# Patient Record
Sex: FEMALE | Race: WHITE
[De-identification: names, ages, dates, MRNs, and addresses within clinical notes are randomized per-mention and may not be internally consistent; named-entity substitution may affect disease eponyms.]

---

## 2017-01-07 ENCOUNTER — HOSPITAL ENCOUNTER (EMERGENCY)
Age: 22
Discharge: HOME | End: 2017-01-07
Payer: COMMERCIAL

## 2017-01-07 DIAGNOSIS — R07.89: Primary | ICD-10-CM

## 2017-01-07 DIAGNOSIS — J45.909: ICD-10-CM

## 2017-01-07 PROCEDURE — 99283 EMERGENCY DEPT VISIT LOW MDM: CPT

## 2018-02-18 ENCOUNTER — HOSPITAL ENCOUNTER (EMERGENCY)
Dept: HOSPITAL 76 - ED | Age: 23
Discharge: HOME | End: 2018-02-18
Payer: MEDICAID

## 2018-02-18 VITALS — SYSTOLIC BLOOD PRESSURE: 140 MMHG | DIASTOLIC BLOOD PRESSURE: 95 MMHG

## 2018-02-18 DIAGNOSIS — Z3A.01: ICD-10-CM

## 2018-02-18 DIAGNOSIS — Z32.01: Primary | ICD-10-CM

## 2018-02-18 DIAGNOSIS — Z79.899: ICD-10-CM

## 2018-02-18 LAB
CLARITY UR REFRACT.AUTO: CLEAR
GLUCOSE UR QL STRIP.AUTO: NEGATIVE MG/DL
HCG UR QL: POSITIVE
KETONES UR QL STRIP.AUTO: NEGATIVE MG/DL
MUCOUS THREADS URNS QL MICRO: (no result)
NITRITE UR QL STRIP.AUTO: POSITIVE
PH UR STRIP.AUTO: 5.5 PH (ref 5–7.5)
PROT UR STRIP.AUTO-MCNC: NEGATIVE MG/DL
RBC # UR STRIP.AUTO: NEGATIVE /UL
RBC # URNS HPF: (no result) /HPF (ref 0–5)
SP GR UR STRIP.AUTO: 1.02 (ref 1–1.03)
SQUAMOUS URNS QL MICRO: (no result)
UROBILINOGEN UR QL STRIP.AUTO: (no result) E.U./DL
UROBILINOGEN UR STRIP.AUTO-MCNC: NEGATIVE MG/DL

## 2018-02-18 PROCEDURE — 84702 CHORIONIC GONADOTROPIN TEST: CPT

## 2018-02-18 PROCEDURE — 99283 EMERGENCY DEPT VISIT LOW MDM: CPT

## 2018-02-18 PROCEDURE — 36415 COLL VENOUS BLD VENIPUNCTURE: CPT

## 2018-02-18 PROCEDURE — 81025 URINE PREGNANCY TEST: CPT

## 2018-02-18 PROCEDURE — 81001 URINALYSIS AUTO W/SCOPE: CPT

## 2018-02-18 PROCEDURE — 81003 URINALYSIS AUTO W/O SCOPE: CPT

## 2018-02-18 PROCEDURE — 87086 URINE CULTURE/COLONY COUNT: CPT

## 2018-02-18 NOTE — ED PHYSICIAN DOCUMENTATION
History of Present Illness





- Stated complaint


Stated Complaint: FEMALE /POSS. PREG





- Chief complaint


Chief Complaint: General





- History obtained from


History obtained from: Patient, Family





- History of Present Illness


Timing: Today





- Additonal information


Additional information: 





22-year-old female has taken a pregnancy test and is found that it is positive.

  She is here now wondering if she can get a blood test to confirm this.She is 

not having other symptoms except for tenderness to her nipples.  She has a 

decreased appetite.  She has a





Review of Systems


Constitutional: reports: Fatigue.  denies: Fever, Chills, Myalgias


Eyes: denies: Decreased vision


Ears: denies: Ear pain


Nose: denies: Congestion


Throat: denies: Sore throat


Cardiac: denies: Chest pain / pressure, Palpitations


Respiratory: denies: Dyspnea, Cough


GI: reports: Nausea.  denies: Abdominal Pain, Vomiting


: denies: Dysuria, Frequency





PD PAST MEDICAL HISTORY





- Past Medical History


Respiratory: Asthma


Psych: Depression, Anxiety





- Past Surgical History


Past Surgical History: Yes


General: Appendectomy





- Present Medications


Home Medications: 


 Ambulatory Orders











 Medication  Instructions  Recorded  Confirmed


 


Albuterol Sulfate [Proair Hfa 2 puffs INH Q4H PRN #1 inhaler 01/07/17 





Inhaler]   


 


Birth Control Pill 1 tab ORAL DAILY 01/07/17 01/07/17


 


cloNIDine HCl [Clonidine HCl] 0.2 tab ORAL DAILY PRN 01/07/17 01/07/17


 


predniSONE [Deltasone] 60 mg PO DAILY 4 Days  tablet 01/07/17 














- Allergies


Allergies/Adverse Reactions: 


 Allergies











Allergy/AdvReac Type Severity Reaction Status Date / Time


 


bacitracin AdvReac Unknown Rash Verified 01/07/17 15:16





[From Neosporin     





(wma-[id number])]     


 


bacitracin zinc * AdvReac Unknown Rash Verified 01/07/17 15:16





[From Neosporin     





(zgt-xkl-ehulj)]     


 


fluticasone propionate * AdvReac Unknown Respiratory Verified 01/07/17 15:16





[From Flovent Diskus]     


 


neomycin sulfate * AdvReac Unknown Rash Verified 01/07/17 15:16





[From Neosporin     





(zak-aze-[id number])]     


 


polymyxin B AdvReac Unknown Rash Verified 01/07/17 15:16





[From Neosporin     





(pyb-ope-eqhko)]     














- Social History


Does the pt smoke?: No


Smoking Status: Never smoker


Does the pt drink ETOH?: No


Does the pt have substance abuse?: No





- Immunizations


Immunizations are current?: Yes





- POLST


Patient has POLST: No





PD ED PE NORMAL





- Vitals


Vital signs reviewed: Yes (hypertensive )





- General


General: Alert and oriented X 3, No acute distress, Well developed/nourished





- HEENT


HEENT: Atraumatic, PERRL, EOMI





- Respiratory


Respiratory: No respiratory distress





- Derm


Derm: Normal color, Warm and dry, No rash





- Extremities


Extremities: No deformity, No edema





- Neuro


Neuro: Alert and oriented X 3, CNs 2-12 intact, No motor deficit, No sensory 

deficit, Normal speech


Eye Opening: Spontaneous


Motor: Obeys Commands


Verbal: Oriented


GCS Score: 15





- Psych


Psych: Normal mood, Normal affect





Results





- Vitals


Vitals: 


 Vital Signs - 24 hr











  02/18/18





  10:46


 


Temperature 36.9 C


 


Heart Rate 92


 


Respiratory 16





Rate 


 


Blood Pressure 140/95 H


 


O2 Saturation 98








 Oxygen











O2 Source                      Room air

















- Labs


Labs: 


 Laboratory Tests











  02/18/18 02/18/18





  10:58 10:58


 


HCG, Quant  130.25 


 


Urine Color   YELLOW


 


Urine Clarity   CLEAR


 


Urine pH   5.5


 


Ur Specific Gravity   1.025


 


Urine Protein   NEGATIVE


 


Urine Glucose (UA)   NEGATIVE


 


Urine Ketones   NEGATIVE


 


Urine Occult Blood   NEGATIVE


 


Urine Nitrite   POSITIVE H


 


Urine Bilirubin   NEGATIVE


 


Urine Urobilinogen   0.2 (NORMAL)


 


Ur Leukocyte Esterase   NEGATIVE


 


Urine RBC   0-5


 


Urine WBC   0-3


 


Ur Squamous Epith Cells   MOD Squamous H


 


Urine Bacteria   Many H


 


Urine Mucus   Few Strands


 


Ur Microscopic Review   INDICATED


 


Urine Culture Comments   NOT INDICATED


 


Urine HCG, Qual   POSITIVE














PD MEDICAL DECISION MAKING





- ED course


Complexity details: reviewed results, re-evaluated patient, considered 

differential, d/w patient, d/w family


ED course: 





22-year-old female with early pregnancy signs and symptoms is pregnant.





Departure





- Departure


Disposition: 01 Home, Self Care


Clinical Impression: 


Pregnancy


Qualifiers:


 Weeks of gestation: less than 8 weeks Qualified Code(s): Z3A.01 - Less than 8 

weeks gestation of pregnancy





Condition: Stable


Instructions:  ED Prenatal Care, ED Preg Established Normal Sxs


Follow-Up: 


Tania Mueller ARNP [Primary Care Provider] - 


Comments: 


Today it appears there is evidence of early pregnancy.  The medications you are 

taking the sertraline does not cause birth defects but could cause some 

problems later in pregnancy talk to your OB doctor about discontinuation of 

this medicine later in her pregnancy.  The clonidine may be more of a problem 

as this a schedule C medication.  Discontinue taking this medication.


Discharge Date/Time: 02/18/18 12:36

## 2018-03-11 ENCOUNTER — HOSPITAL ENCOUNTER (EMERGENCY)
Dept: HOSPITAL 76 - ED | Age: 23
Discharge: HOME | End: 2018-03-11
Payer: MEDICAID

## 2018-03-11 VITALS — DIASTOLIC BLOOD PRESSURE: 91 MMHG | SYSTOLIC BLOOD PRESSURE: 131 MMHG

## 2018-03-11 DIAGNOSIS — O26.891: Primary | ICD-10-CM

## 2018-03-11 DIAGNOSIS — Z3A.01: ICD-10-CM

## 2018-03-11 DIAGNOSIS — R05: ICD-10-CM

## 2018-03-11 DIAGNOSIS — H66.002: ICD-10-CM

## 2018-03-11 PROCEDURE — 99283 EMERGENCY DEPT VISIT LOW MDM: CPT

## 2018-03-11 NOTE — ED PHYSICIAN DOCUMENTATION
PD HPI URI





- Stated complaint


Stated Complaint: COUGH/COLD





- Chief complaint


Chief Complaint: Heent





- History obtained from


History obtained from: Patient, Family





- History of Present Illness


Timing - onset: How many days ago (3)


Timing duration: Days (3)


Timing details: Gradual onset, Still present


Associated symptoms: Ear pain, Nasal congestion, Rhinorrhea, Sore throat, Dry 

cough.  No: Fever


Improves by: Rest


Worsened by: Activity


Similar symptoms before: Diagnosis (OM and bronchitis)


Recently seen: Emergency Dept (seen last month for new onset pregnancy)





- Additional information


Additional information: 





22-year-old female who is about 7 weeks pregnant has developed a cough and 

congestion.  She is having coughing paroxysms and she has had to use her 

inhaler.  She is developed now a bit of a sore throat on the left side and pain 

in her left ear.She has an appointment to see her first OB visit in 2 weeks.





Review of Systems


Constitutional: denies: Fever


Eyes: denies: Decreased vision


Ears: reports: Ear pain


Nose: reports: Rhinorrhea / runny nose, Congestion


Throat: reports: Sore throat


Cardiac: denies: Chest pain / pressure, Palpitations


Respiratory: reports: Dyspnea, Cough


GI: denies: Nausea, Vomiting


: reports: Frequency.  denies: Dysuria


Skin: denies: Rash


Musculoskeletal: denies: Neck pain, Back pain, Extremity pain


Neurologic: denies: Generalized weakness, Focal weakness, Numbness





PD PAST MEDICAL HISTORY





- Past Medical History


Past Medical History: Yes


Respiratory: Asthma


Psych: Depression, Anxiety





- Past Surgical History


Past Surgical History: Yes


General: Appendectomy





- Present Medications


Home Medications: 


 Ambulatory Orders











 Medication  Instructions  Recorded  Confirmed


 


Azithromycin [Zithromax] 250 mg PO DAILY #4 tablet 03/11/18 


 


Levothyroxine [Synthroid] 88 mcg PO QDAC 03/11/18 














- Allergies


Allergies/Adverse Reactions: 


 Allergies











Allergy/AdvReac Type Severity Reaction Status Date / Time


 


bacitracin AdvReac Unknown Rash Verified 03/11/18 19:23





[From Neosporin     





(brv-cvm-voolt)]     


 


bacitracin zinc * AdvReac Unknown Rash Verified 03/11/18 19:23





[From Neosporin     





(kcs-gyb-wiwge)]     


 


fluticasone propionate * AdvReac Unknown Respiratory Verified 03/11/18 19:23





[From Flovent Diskus]     


 


neomycin sulfate * AdvReac Unknown Rash Verified 03/11/18 19:23





[From Neosporin     





(cgt-otb-svaub)]     


 


polymyxin B AdvReac Unknown Rash Verified 03/11/18 19:23





[From Neosporin     





(fhb-mnz-grclw)]     














- Social History


Does the pt smoke?: No


Smoking Status: Never smoker


Does the pt drink ETOH?: No


Does the pt have substance abuse?: No





- Immunizations


Immunizations are current?: Yes





- POLST


Patient has POLST: No





PD ED PE NORMAL





- Vitals


Vital signs reviewed: Yes (Normal)





- General


General: Alert and oriented X 3, No acute distress, Well developed/nourished





- HEENT


HEENT: Atraumatic, PERRL, EOMI, Other (There is inflammation to the left TM 

with some distortion of landmarks the right is clear)





- Neck


Neck: Supple, no meningeal sign, No bony TTP





- Cardiac


Cardiac: RRR, No murmur





- Respiratory


Respiratory: No respiratory distress, Clear bilaterally





- Abdomen


Abdomen: Soft, Non tender





- Back


Back: No CVA TTP, No spinal TTP





- Derm


Derm: Normal color, Warm and dry, No rash





- Extremities


Extremities: No deformity, No edema





- Neuro


Neuro: No motor deficit, No sensory deficit


Eye Opening: Spontaneous


Motor: Obeys Commands


Verbal: Oriented


GCS Score: 15





- Psych


Psych: Normal mood, Normal affect





Results





- Vitals


Vitals: 





 Vital Signs - 24 hr











  03/11/18 03/11/18





  19:22 21:11


 


Temperature 36.6 C 36.4 C L


 


Heart Rate 98 


 


Respiratory 16 





Rate  


 


Blood Pressure 120/80 


 


O2 Saturation 100 








 Oxygen











O2 Source                      Room air

















Procedures





- Bedside sono


Bedside sono by EMP: 





With use of bedside ultrasound the pelvis is imaged and there is a poor 

acoustic window but a gestational sac measuring 7 weeks 6 days is present and 

further imaging is not indicated today.





PD MEDICAL DECISION MAKING





- ED course


Complexity details: considered differential, d/w patient, d/w family


ED course: 





22-year-old female 7 1/2 weeks pregnant has left otitis and she is having 

coughing paroxysms.  She is treated in the emergency department with 

dexamethasone and azithromycin.  She will follow-up with her OB/GYN in 2 weeks.





Departure





- Departure


Disposition: 01 Home, Self Care


Clinical Impression: 


Otitis media


Qualifiers:


 Otitis media type: suppurative Chronicity: acute Laterality: left Recurrence: 

not specified as recurrent Spontaneous tympanic membrane rupture: without 

spontaneous rupture Qualified Code(s): H66.002 - Acute suppurative otitis media 

without spontaneous rupture of ear drum, left ear





Condition: Stable


Instructions:  ED Otitis Media Acute Adult


Follow-Up: 


Your, doctor [Other]


Prescriptions: 


Azithromycin [Zithromax] 250 mg PO DAILY #4 tablet

## 2018-03-26 ENCOUNTER — HOSPITAL ENCOUNTER (EMERGENCY)
Dept: HOSPITAL 76 - ED | Age: 23
Discharge: HOME | End: 2018-03-26
Payer: MEDICAID

## 2018-03-26 VITALS — SYSTOLIC BLOOD PRESSURE: 119 MMHG | DIASTOLIC BLOOD PRESSURE: 85 MMHG

## 2018-03-26 DIAGNOSIS — Z3A.09: ICD-10-CM

## 2018-03-26 DIAGNOSIS — O46.91: Primary | ICD-10-CM

## 2018-03-26 LAB
ALBUMIN DIAFP-MCNC: 3.4 G/DL (ref 3.2–5.5)
ALBUMIN/GLOB SERPL: 0.9 {RATIO} (ref 1–2.2)
ALP SERPL-CCNC: 51 IU/L (ref 42–121)
ALT SERPL W P-5'-P-CCNC: 15 IU/L (ref 10–60)
ANION GAP SERPL CALCULATED.4IONS-SCNC: 9 MMOL/L (ref 6–13)
AST SERPL W P-5'-P-CCNC: 16 IU/L (ref 10–42)
BASOPHILS NFR BLD AUTO: 0.1 10^3/UL (ref 0–0.1)
BASOPHILS NFR BLD AUTO: 0.6 %
BILIRUB BLD-MCNC: 0.6 MG/DL (ref 0.2–1)
BUN SERPL-MCNC: 9 MG/DL (ref 6–20)
CALCIUM UR-MCNC: 8.8 MG/DL (ref 8.5–10.3)
CHLORIDE SERPL-SCNC: 105 MMOL/L (ref 101–111)
CLARITY UR REFRACT.AUTO: CLEAR
CO2 SERPL-SCNC: 22 MMOL/L (ref 21–32)
CREAT SERPLBLD-SCNC: 0.6 MG/DL (ref 0.4–1)
EOSINOPHIL # BLD AUTO: 0.1 10^3/UL (ref 0–0.7)
EOSINOPHIL NFR BLD AUTO: 1.2 %
ERYTHROCYTE [DISTWIDTH] IN BLOOD BY AUTOMATED COUNT: 14.3 % (ref 12–15)
GFRSERPLBLD MDRD-ARVRAT: 123 ML/MIN/{1.73_M2} (ref 89–?)
GLOBULIN SER-MCNC: 3.7 G/DL (ref 2.1–4.2)
GLUCOSE SERPL-MCNC: 95 MG/DL (ref 70–100)
GLUCOSE UR QL STRIP.AUTO: NEGATIVE MG/DL
HGB UR QL STRIP: 11.9 G/DL (ref 12–16)
KETONES UR QL STRIP.AUTO: NEGATIVE MG/DL
LIPASE SERPL-CCNC: 21 U/L (ref 22–51)
LYMPHOCYTES # SPEC AUTO: 2 10^3/UL (ref 1.5–3.5)
LYMPHOCYTES NFR BLD AUTO: 22.5 %
MCH RBC QN AUTO: 25.8 PG (ref 27–31)
MCHC RBC AUTO-ENTMCNC: 32.6 G/DL (ref 32–36)
MCV RBC AUTO: 79.2 FL (ref 81–99)
MONOCYTES # BLD AUTO: 0.6 10^3/UL (ref 0–1)
MONOCYTES NFR BLD AUTO: 7 %
NEUTROPHILS # BLD AUTO: 6.2 10^3/UL (ref 1.5–6.6)
NEUTROPHILS # SNV AUTO: 9.1 X10^3/UL (ref 4.8–10.8)
NEUTROPHILS NFR BLD AUTO: 68.7 %
NITRITE UR QL STRIP.AUTO: NEGATIVE
PDW BLD AUTO: 6.6 FL (ref 7.9–10.8)
PH UR STRIP.AUTO: 5.5 PH (ref 5–7.5)
PLATELET # BLD: 310 10^3/UL (ref 130–450)
PROT SPEC-MCNC: 7.1 G/DL (ref 6.7–8.2)
PROT UR STRIP.AUTO-MCNC: NEGATIVE MG/DL
RBC # UR STRIP.AUTO: NEGATIVE /UL
RBC MAR: 4.61 10^6/UL (ref 4.2–5.4)
SODIUM SERPLBLD-SCNC: 136 MMOL/L (ref 135–145)
SP GR UR STRIP.AUTO: >=1.03 (ref 1–1.03)
UROBILINOGEN UR QL STRIP.AUTO: (no result) E.U./DL
UROBILINOGEN UR STRIP.AUTO-MCNC: NEGATIVE MG/DL

## 2018-03-26 PROCEDURE — 83690 ASSAY OF LIPASE: CPT

## 2018-03-26 PROCEDURE — 85025 COMPLETE CBC W/AUTO DIFF WBC: CPT

## 2018-03-26 PROCEDURE — 87086 URINE CULTURE/COLONY COUNT: CPT

## 2018-03-26 PROCEDURE — 36415 COLL VENOUS BLD VENIPUNCTURE: CPT

## 2018-03-26 PROCEDURE — 81001 URINALYSIS AUTO W/SCOPE: CPT

## 2018-03-26 PROCEDURE — 81003 URINALYSIS AUTO W/O SCOPE: CPT

## 2018-03-26 PROCEDURE — 76801 OB US < 14 WKS SINGLE FETUS: CPT

## 2018-03-26 PROCEDURE — 84702 CHORIONIC GONADOTROPIN TEST: CPT

## 2018-03-26 PROCEDURE — 99283 EMERGENCY DEPT VISIT LOW MDM: CPT

## 2018-03-26 PROCEDURE — 80053 COMPREHEN METABOLIC PANEL: CPT

## 2018-03-26 NOTE — ULTRASOUND PRELIMINARY REPORT
Accession: [id number]

Exam: US OB FIRST TRIMESTER

 

IMPRESSION: 

1. Single viable intrauterine pregnancy at EGA 9 weeks 0 days with STEFANO 10/29/2018 based on crown-rump
 length.

 

2. Small perigestational fluid collection, nonhemorrhagic in appearance.

 

3. Unremarkable ovaries and adnexa for age and menstrual status.

 

Complete fetal anatomic survey recommended at 20 weeks.

 

[facility]

 

SITE ID: [id number]

## 2018-03-26 NOTE — ED PHYSICIAN DOCUMENTATION
PD HPI FEMALE 





- Stated complaint


Stated Complaint: 10 WKS/CRAMPS





- Chief complaint


Chief Complaint: General





- History obtained from


History obtained from: Patient





- History of Present Illness


Timing - onset: Today (about 10 am today, onset of some lower abd cramping with 

spotting of vaginal bleeding.)


Timing - duration: Days (1)


Timing - details: Gradual onset, Still present, Waxing and waning


Associated symptoms: Pelvic pain, Vaginal bleeding.  No: Fever, Abdominal pain, 

Vaginal discharge, Dysuria, Urinary frequency


Contributing factors: Pregnant (estimated 10 weeks by dates.)


OB-GYN History: G (1), P (0)


Similar symptoms before: Has not had sx before


Recently seen: Emergency Dept (2 weeks ago for URI/ear infection and Rx with 

Zithromax and was feeling better.)





Review of Systems


Constitutional: denies: Fever, Chills


Nose: denies: Rhinorrhea / runny nose, Congestion


Throat: denies: Sore throat


Respiratory: denies: Cough


GI: reports: Nausea.  denies: Vomiting, Diarrhea


: denies: Dysuria, Frequency, Discharge


Musculoskeletal: denies: Neck pain, Back pain





PD PAST MEDICAL HISTORY





- Past Medical History


Respiratory: Asthma


Psych: Depression, Anxiety





- Past Surgical History


Past Surgical History: Yes


General: Appendectomy





- Present Medications


Home Medications: 


 Ambulatory Orders











 Medication  Instructions  Recorded  Confirmed


 


Levothyroxine [Synthroid] 88 mcg PO QDAC 03/11/18 


 


Ondansetron HCl [Zofran] 4 mg PO Q6H PRN #20 tablet 03/26/18 


 


Pnv95/Ferrous Fumarate/FA 1 each PO DAILY 03/26/18 03/26/18





[Prenatal Tablet]   














- Allergies


Allergies/Adverse Reactions: 


 Allergies











Allergy/AdvReac Type Severity Reaction Status Date / Time


 


bacitracin AdvReac Unknown Rash Verified 03/26/18 16:44





[From Neosporin     





(chc-leh-pvdmz)]     


 


bacitracin zinc * AdvReac Unknown Rash Verified 03/26/18 16:44





[From Neosporin     





(qfx-dmg-dkgoo)]     


 


fluticasone propionate * AdvReac Unknown Respiratory Verified 03/26/18 16:44





[From Flovent Diskus]     


 


neomycin sulfate * AdvReac Unknown Rash Verified 03/26/18 16:44





[From Neosporin     





(vht-kif-hwzql)]     


 


polymyxin B AdvReac Unknown Rash Verified 03/26/18 16:44





[From Neosporin     





(yaz-uvg-yskpb)]     














- Social History


Does the pt smoke?: No


Smoking Status: Never smoker


Does the pt drink ETOH?: No


Does the pt have substance abuse?: No





- Immunizations


Immunizations are current?: Yes





- POLST


Patient has POLST: No





PD ED PE NORMAL





- Vitals


Vital signs reviewed: Yes





- General


General: Alert and oriented X 3, No acute distress, Well developed/nourished





- Neck


Neck: Supple, no meningeal sign, No adenopathy





- Cardiac


Cardiac: RRR, No murmur





- Respiratory


Respiratory: Clear bilaterally





- Abdomen


Abdomen: Normal bowel sounds, Soft, Non distended, No organomegaly, Other (mild 

tender suprapubic without guarding. Bedside U/S, I could not get a good view, 

so will get formal U/S. )





Results





- Vitals


Vitals: 


 Vital Signs - 24 hr











  03/26/18 03/26/18





  16:42 20:42


 


Temperature 36.8 C 


 


Heart Rate 90 96


 


Respiratory 18 18





Rate  


 


Blood Pressure 126/73 119/85 H


 


O2 Saturation 99 99








 Oxygen











O2 Source                      Room air

















- Labs


Labs: 


 Laboratory Tests











  03/26/18 03/26/18 03/26/18





  17:18 17:18 17:18


 


WBC  9.1  


 


RBC  4.61  


 


Hgb  11.9 L  


 


Hct  36.5 L  


 


MCV  79.2 L  


 


MCH  25.8 L  


 


MCHC  32.6  


 


RDW  14.3  


 


Plt Count  310  


 


MPV  6.6 L  


 


Neut #  6.2  


 


Lymph #  2.0  


 


Mono #  0.6  


 


Eos #  0.1  


 


Baso #  0.1  


 


Absolute Nucleated RBC  0.01  


 


Nucleated RBC %  0.1  


 


Sodium   136 


 


Potassium   3.8 


 


Chloride   105 


 


Carbon Dioxide   22 


 


Anion Gap   9.0 


 


BUN   9 


 


Creatinine   0.6 


 


Estimated GFR (MDRD)   123 


 


Glucose   95 


 


Calcium   8.8 


 


Total Bilirubin   0.6 


 


AST   16 


 


ALT   15 


 


Alkaline Phosphatase   51 


 


Total Protein   7.1 


 


Albumin   3.4 


 


Globulin   3.7 


 


Albumin/Globulin Ratio   0.9 L 


 


Lipase   21 L 


 


HCG, Quant    757411.00


 


Urine Color   


 


Urine Clarity   


 


Urine pH   


 


Ur Specific Gravity   


 


Urine Protein   


 


Urine Glucose (UA)   


 


Urine Ketones   


 


Urine Occult Blood   


 


Urine Nitrite   


 


Urine Bilirubin   


 


Urine Urobilinogen   


 


Ur Leukocyte Esterase   


 


Ur Microscopic Review   


 


Urine Culture Comments   














  03/26/18





  20:00


 


WBC 


 


RBC 


 


Hgb 


 


Hct 


 


MCV 


 


MCH 


 


MCHC 


 


RDW 


 


Plt Count 


 


MPV 


 


Neut # 


 


Lymph # 


 


Mono # 


 


Eos # 


 


Baso # 


 


Absolute Nucleated RBC 


 


Nucleated RBC % 


 


Sodium 


 


Potassium 


 


Chloride 


 


Carbon Dioxide 


 


Anion Gap 


 


BUN 


 


Creatinine 


 


Estimated GFR (MDRD) 


 


Glucose 


 


Calcium 


 


Total Bilirubin 


 


AST 


 


ALT 


 


Alkaline Phosphatase 


 


Total Protein 


 


Albumin 


 


Globulin 


 


Albumin/Globulin Ratio 


 


Lipase 


 


HCG, Quant 


 


Urine Color  YELLOW


 


Urine Clarity  CLEAR


 


Urine pH  5.5


 


Ur Specific Gravity  >=1.030 H


 


Urine Protein  NEGATIVE


 


Urine Glucose (UA)  NEGATIVE


 


Urine Ketones  NEGATIVE


 


Urine Occult Blood  NEGATIVE


 


Urine Nitrite  NEGATIVE


 


Urine Bilirubin  NEGATIVE


 


Urine Urobilinogen  0.2 (NORMAL)


 


Ur Leukocyte Esterase  NEGATIVE


 


Ur Microscopic Review  NOT INDICATED


 


Urine Culture Comments  NOT INDICATED














- Rads (name of study)


  ** first trimester OB US


Radiology: Prelim report reviewed (IUP 9.0 size, with .)





PD MEDICAL DECISION MAKING





- ED course


Complexity details: reviewed results, considered differential, d/w patient





Departure





- Departure


Disposition: 01 Home, Self Care


Clinical Impression: 


 Vaginal bleeding in pregnancy





Pregnancy


Qualifiers:


 Weeks of gestation: 9 weeks Qualified Code(s): Z3A.09 - 9 weeks gestation of 

pregnancy





Condition: Stable


Record reviewed to determine appropriate education?: Yes


Instructions:  Bleeding Early Preg


Follow-Up: 


YOUNG THOMPSON [Primary Care Provider] - 


Nilton Gaffney MD [Provider Admit Priv/Credential] - 


Prescriptions: 


Ondansetron HCl [Zofran] 4 mg PO Q6H PRN #20 tablet


 PRN Reason: Nausea / Vomiting


Comments: 


Your ultrasound shows a normal pregnancy with good heart rate at this time.  

With vaginal bleeding and cramping there is always a concern for threatened 

miscarriage.  Return if you have increased bleeding pain fever or other 

concerns.  Most of the time episodes like this will stop within a day or 2 and 

things will be fine.  Continue your vitamin B6.  Add ondansetron if needed for 

nausea.  Tylenol is fine throughout pregnancy for pains.  He can use ibuprofen 

at this point in pregnancy up to about 30 weeks.  Initiate OB/GYN care, call 

for an appointment.  Return as needed.


Discharge Date/Time: 03/26/18 20:42

## 2018-03-26 NOTE — ULTRASOUND REPORT
EXAM:

FIRST TRIMESTER OBSTETRIC ULTRASOUND 

(Less than 11 weeks)

 

EXAM DATE: 3/26/2018 06:52 PM.

 

CLINICAL HISTORY: Lower abdominal cramping in a 22-year-old female with early pregnancy, about 10 wee
ks. 

LMP: Unknown. 

 

COMPARISONS: None.

 

TECHNIQUE: Transabdominal ultrasound examination with static image documentation on an emergent basis
. Patient declined transvaginal scanning. 

 

CLINICAL DATES: 

Indeterminate.

 

ASSESSMENT:

Gestational Sac: Single intrauterine.  Well formed. Mean gestational sac diameter: 49.7 mm = 10 weeks
 5 days.

Embryo: CRL (crown-rump length) 24.9 mm = 9 weeks 0 days.

Cardiac activity: 177 beats per minute.

Yolk sac: 4.7 mm.

Amniotic fluid: Not accurately assessed at this gestational age. Grossly unremarkable.

Early placenta: Not visible at this gestational age.

Other: Small perigestational fluid collection 14 x 4 x 3 mm.

 

MATERNAL STRUCTURES:

Uterus: Anteverted/Retroverted. Unremarkable.

Cervix: Not well visualized due to lack of transvaginal scanning.

Right Ovary/Adnexa: Unremarkable.  The ovary measures 3.4 x 1.9 x 2.6 cm, volume 8.7 cc. 

Left Ovary/Adnexa: Unremarkable.  The ovary measures 2.6 x 1.6 x 1.8 cm, volume 3.9 cc.

Free Fluid: None.

 

Other: None.

 

IMPRESSION: 

1. Single viable intrauterine pregnancy at EGA 9 weeks 0 days with STEFANO 10/29/2018 based on crown-rump
 length.

 

2. Small perigestational fluid collection, nonhemorrhagic in appearance.

 

3. Unremarkable ovaries and adnexa for age and menstrual status.

 

Complete fetal anatomic survey recommended at 20 weeks.

 

[facility]

Referring Provider Line: [phone]

 

SITE ID: [id number]

## 2020-03-01 ENCOUNTER — HOSPITAL ENCOUNTER (EMERGENCY)
Dept: HOSPITAL 76 - ED | Age: 25
Discharge: HOME | End: 2020-03-01
Payer: MEDICAID

## 2020-03-01 VITALS — SYSTOLIC BLOOD PRESSURE: 151 MMHG | DIASTOLIC BLOOD PRESSURE: 88 MMHG
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
## 2020-03-01 DIAGNOSIS — S90.121A: ICD-10-CM
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
## 2020-03-01 DIAGNOSIS — S93.504A: Primary | ICD-10-CM

## 2020-03-01 PROCEDURE — 73660 X-RAY EXAM OF TOE(S): CPT

## 2020-03-01 PROCEDURE — 99282 EMERGENCY DEPT VISIT SF MDM: CPT

## 2020-03-01 PROCEDURE — 99283 EMERGENCY DEPT VISIT LOW MDM: CPT

## 2020-03-01 NOTE — ED PHYSICIAN DOCUMENTATION
PD HPI LOWER EXT INJURY





- Stated complaint


Stated Complaint: R TOE INJ





- Chief complaint


Chief Complaint: Trauma Ext





- History obtained from


History obtained from: Patient





- History of Present Illness


PD HPI LOW EXT INJURY LOCATION: Left, Toe





- Additional information


Additional information: 


Patient comes emergency department complaining ofRight small toe pain after 

walking and hitting her toe on the foot of the bed.  Patient states that she was

not injured in any other way.  She states this happened around 1400 today.  She 

has been able to walk on it, but it hurts.  She denies any prior injury to the 

toe.  No other complaints at this time.  Patient does note that she felt a "pop"

when the incident occurred.








Review of Systems


Ten Systems: 10 systems reviewed and negative


Constitutional: reports: Reviewed and negative


Eyes: reports: Reviewed and negative


Ears: reports: Reviewed and negative


Nose: reports: Reviewed and negative


Throat: reports: Reviewed and negative


Cardiac: reports: Reviewed and negative


Respiratory: reports: Reviewed and negative


GI: reports: Reviewed and negative


: reports: Reviewed and negative


Skin: reports: Reviewed and negative


Musculoskeletal: reports: Extremity pain (Right small toe)


Neurologic: reports: Reviewed and negative


Psychiatric: reports: Reviewed and negative


Endocrine: reports: Reviewed and negative


Immunocompromised: reports: Reviewed and negative





PD PAST MEDICAL HISTORY





- Past Medical History


Respiratory: Asthma


Psych: Depression, Anxiety





- Past Surgical History


Past Surgical History: Yes


General: Appendectomy





- Present Medications


Home Medications: 


                                Ambulatory Orders











 Medication  Instructions  Recorded  Confirmed


 


Levothyroxine [Synthroid] 88 mcg PO QDAC 03/11/18 


 


Ondansetron HCl [Zofran] 4 mg PO Q6H PRN #20 tablet 03/26/18 


 


Pnv No.95/Ferrous Fum/Folic AC 1 each PO DAILY 03/26/18 03/26/18





[Prenatal Tablet]   














- Allergies


Allergies/Adverse Reactions: 


                                    Allergies











Allergy/AdvReac Type Severity Reaction Status Date / Time


 


bacitracin AdvReac Unknown Rash Verified 03/01/20 20:12





[From Neosporin     





(huz-guh-jfyxr)]     


 


bacitracin zinc * AdvReac Unknown Rash Verified 03/01/20 20:12





[From Neosporin     





(bnc-lqp-rqvxt)]     


 


fluticasone propionate * AdvReac Unknown Respiratory Verified 03/01/20 20:12





[From Flovent Diskus]     


 


neomycin sulfate * AdvReac Unknown Rash Verified 03/01/20 20:12





[From Neosporin     





(kaq-cnz-olksc)]     


 


polymyxin B AdvReac Unknown Rash Verified 03/01/20 20:12





[From Neosporin     





(vut-ire-inhpc)]     














- Social History


Does the pt smoke?: No


Smoking Status: Never smoker


Does the pt drink ETOH?: No


Does the pt have substance abuse?: No





- Immunizations


Immunizations are current?: Yes





- POLST


Patient has POLST: No





PD ED PE NORMAL





- Vitals


Vital signs reviewed: Yes





- General


General: Alert and oriented X 3, No acute distress





- HEENT


HEENT: Atraumatic, PERRL





- Neck


Neck: Supple, no meningeal sign





- Cardiac


Cardiac: Strong equal pulses





- Respiratory


Respiratory: No respiratory distress





- Derm


Derm: Normal color, Warm and dry, No rash





- Extremities


Extremities: No deformity, Other (Right small toe is edematous, contused, and 

tender.Patient does have full range of motion of the toe.)





- Neuro


Neuro: Alert and oriented X 3





- Psych


Psych: Normal mood, Normal affect





Results





- Vitals


Vitals: 


                               Vital Signs - 24 hr











  03/01/20





  20:13


 


Temperature 36.5 C


 


Heart Rate 88


 


Respiratory 16





Rate 


 


Blood Pressure 151/88 H


 


O2 Saturation 98








                                     Oxygen











O2 Source                      Room air

















- Rads (name of study)


  ** Toe x-ray series


Radiology: Final report received, EMP read indepedently (Final radiologist 

impression: Normal toe radiography), See rad report





PD MEDICAL DECISION MAKING





- ED course


Complexity details: reviewed results, re-evaluated patient, considered 

differential, d/w patient


ED course: 





Patient was worked up with x-rays of the toe, which were found to be negative.





Departure





- Departure


Disposition: 01 Home, Self Care


Clinical Impression: 


 Sprain of toe, fifth, right





Condition: Good


Instructions:  ED Sprain Toe


Comments: 


Your toe x-ray does not show any broken bones.  You have most likely sprained 

your toe, which can cause pain, swelling, and bruising.  This will get better on

its own without any further intervention.  You may bear weight and walk, and as 

tolerated.  You may take ibuprofen and Tylenol as needed for the discomfort.  

Apply ice and elevate your foot to help with the swelling.


Discharge Date/Time: 03/01/20 21:55

## 2020-03-01 NOTE — XRAY REPORT
Reason:  trauma/pain/swelling

Procedure Date:  03/01/2020   

Accession Number:  [id number] / [id number]                    

Procedure:  XR  - Toe(s) RT CPT Code:  

 

***Final Report***

 

 

FULL RESULT:

 

 

EXAM:

RIGHT TOE RADIOGRAPHY

 

EXAM DATE: 3/1/2020 09:24 PM.

 

CLINICAL HISTORY: Trauma/pain/swelling.

 

COMPARISON: None.

 

TECHNIQUE: 3 views.

 

FINDINGS:

Bones: Normal. No fracture or bone lesion.

 

Joints: Normal. No subluxations.

 

Soft Tissues: Normal. No soft tissue swelling.

IMPRESSION: Normal toe radiography.

 

[facility]